# Patient Record
Sex: MALE | Race: BLACK OR AFRICAN AMERICAN | Employment: OTHER | URBAN - METROPOLITAN AREA
[De-identification: names, ages, dates, MRNs, and addresses within clinical notes are randomized per-mention and may not be internally consistent; named-entity substitution may affect disease eponyms.]

---

## 2020-02-02 ENCOUNTER — APPOINTMENT (OUTPATIENT)
Dept: GENERAL RADIOLOGY | Age: 75
End: 2020-02-02
Attending: EMERGENCY MEDICINE
Payer: MEDICARE

## 2020-02-02 ENCOUNTER — HOSPITAL ENCOUNTER (EMERGENCY)
Age: 75
Discharge: HOME | End: 2020-02-02
Attending: EMERGENCY MEDICINE
Payer: MEDICARE

## 2020-02-02 VITALS
TEMPERATURE: 97.9 F | WEIGHT: 200 LBS | OXYGEN SATURATION: 90 % | HEIGHT: 71 IN | BODY MASS INDEX: 28 KG/M2 | HEART RATE: 70 BPM | DIASTOLIC BLOOD PRESSURE: 89 MMHG | RESPIRATION RATE: 16 BRPM | SYSTOLIC BLOOD PRESSURE: 188 MMHG

## 2020-02-02 DIAGNOSIS — M70.21 OLECRANON BURSITIS OF RIGHT ELBOW: Primary | ICD-10-CM

## 2020-02-02 DIAGNOSIS — G89.29 CHRONIC LEFT FLANK PAIN: ICD-10-CM

## 2020-02-02 DIAGNOSIS — R10.9 CHRONIC LEFT FLANK PAIN: ICD-10-CM

## 2020-02-02 LAB
ALBUMIN SERPL-MCNC: 4.1 G/DL (ref 3.2–4.6)
ALBUMIN/GLOB SERPL: 1 {RATIO} (ref 1.2–3.5)
ALP SERPL-CCNC: 66 U/L (ref 50–136)
ALT SERPL-CCNC: 30 U/L (ref 12–65)
ANION GAP SERPL CALC-SCNC: 6 MMOL/L (ref 7–16)
APPEARANCE FLD: NORMAL
AST SERPL-CCNC: 24 U/L (ref 15–37)
BASOPHILS # BLD: 0 K/UL (ref 0–0.2)
BASOPHILS NFR BLD: 1 % (ref 0–2)
BILIRUB SERPL-MCNC: 0.3 MG/DL (ref 0.2–1.1)
BUN SERPL-MCNC: 11 MG/DL (ref 8–23)
CALCIUM SERPL-MCNC: 9.5 MG/DL (ref 8.3–10.4)
CHLORIDE SERPL-SCNC: 105 MMOL/L (ref 98–107)
CO2 SERPL-SCNC: 29 MMOL/L (ref 21–32)
COLOR FLD: NORMAL
CREAT SERPL-MCNC: 1.14 MG/DL (ref 0.8–1.5)
DIFFERENTIAL METHOD BLD: ABNORMAL
EOSINOPHIL # BLD: 0 K/UL (ref 0–0.8)
EOSINOPHIL NFR BLD: 0 % (ref 0.5–7.8)
ERYTHROCYTE [DISTWIDTH] IN BLOOD BY AUTOMATED COUNT: 14.9 % (ref 11.9–14.6)
FLUID COMMENTS, FCOM: NORMAL
GLOBULIN SER CALC-MCNC: 4.1 G/DL (ref 2.3–3.5)
GLUCOSE SERPL-MCNC: 93 MG/DL (ref 65–100)
HCT VFR BLD AUTO: 49.5 % (ref 41.1–50.3)
HGB BLD-MCNC: 15.7 G/DL (ref 13.6–17.2)
IMM GRANULOCYTES # BLD AUTO: 0.2 K/UL (ref 0–0.5)
IMM GRANULOCYTES NFR BLD AUTO: 3 % (ref 0–5)
LYMPHOCYTES # BLD: 1.6 K/UL (ref 0.5–4.6)
LYMPHOCYTES NFR BLD: 24 % (ref 13–44)
LYMPHOCYTES NFR BRONCH MANUAL: 88 %
MCH RBC QN AUTO: 26 PG (ref 26.1–32.9)
MCHC RBC AUTO-ENTMCNC: 31.7 G/DL (ref 31.4–35)
MCV RBC AUTO: 82.1 FL (ref 79.6–97.8)
MONOCYTES # BLD: 0.8 K/UL (ref 0.1–1.3)
MONOCYTES NFR BLD: 12 % (ref 4–12)
NEUTROPHILS NFR BRONCH MANUAL: 12 %
NEUTS SEG # BLD: 4 K/UL (ref 1.7–8.2)
NEUTS SEG NFR BLD: 60 % (ref 43–78)
NRBC # BLD: 0 K/UL (ref 0–0.2)
NUC CELL # FLD: 510 /CU MM
PLATELET # BLD AUTO: 433 K/UL (ref 150–450)
PMV BLD AUTO: 9.7 FL (ref 9.4–12.3)
POTASSIUM SERPL-SCNC: 3.7 MMOL/L (ref 3.5–5.1)
PROT SERPL-MCNC: 8.2 G/DL (ref 6.3–8.2)
RBC # BLD AUTO: 6.03 M/UL (ref 4.23–5.6)
RBC # FLD: NORMAL /CU MM
SODIUM SERPL-SCNC: 140 MMOL/L (ref 136–145)
SPECIMEN SOURCE FLD: NORMAL
WBC # BLD AUTO: 6.7 K/UL (ref 4.3–11.1)

## 2020-02-02 PROCEDURE — 80053 COMPREHEN METABOLIC PANEL: CPT

## 2020-02-02 PROCEDURE — 87205 SMEAR GRAM STAIN: CPT

## 2020-02-02 PROCEDURE — 85025 COMPLETE CBC W/AUTO DIFF WBC: CPT

## 2020-02-02 PROCEDURE — 99283 EMERGENCY DEPT VISIT LOW MDM: CPT

## 2020-02-02 PROCEDURE — 36415 COLL VENOUS BLD VENIPUNCTURE: CPT

## 2020-02-02 PROCEDURE — 89060 EXAM SYNOVIAL FLUID CRYSTALS: CPT

## 2020-02-02 PROCEDURE — 73080 X-RAY EXAM OF ELBOW: CPT

## 2020-02-02 PROCEDURE — 89050 BODY FLUID CELL COUNT: CPT

## 2020-02-02 PROCEDURE — 75810000054 HC ARTHOCENTISIS JOINT

## 2020-02-02 RX ORDER — NAPROXEN SODIUM 550 MG/1
550 TABLET ORAL 2 TIMES DAILY WITH MEALS
Qty: 10 TAB | Refills: 0 | Status: SHIPPED | OUTPATIENT
Start: 2020-02-02

## 2020-02-02 NOTE — DISCHARGE INSTRUCTIONS
Patient Education      Percent twice daily for pain and inflammation. Over-the-counter Tylenol 500 to 650 mg every 4 hours as needed for pain. Keep your right elbow wrapped and compressed as much as possible the next week. Ice to the area for 20 minutes every 4 hours while awake for 2 to 3 days. Call [facility] tomorrow for follow-up appointment and recheck in the next 1 to 2 weeks. Call the [facility] tomorrow for follow-up and recheck later this week. Your culture and crystal examination of the fluid removed from your elbow will need to be reviewed later this week when they return. Return here if redness warmth or fever in your right elbow. Return if fever. Bursitis: Care Instructions  Your Care Instructions    A bursa is a small sac of fluid that helps the tissues around a joint slide over one another easily. Injury or overuse of a joint can cause pain, redness, and inflammation in the bursa (bursitis). Bursitis usually gets better if you avoid the activity that caused it. You can help prevent bursitis from coming back by doing stretching and strengthening exercises. You may also need to change the way you do some activities. Follow-up care is a key part of your treatment and safety. Be sure to make and go to all appointments, and call your doctor if you are having problems. It's also a good idea to know your test results and keep a list of the medicines you take. How can you care for yourself at home? · Put ice or a cold pack on the area for 10 to 20 minutes at a time. Try to do this every 1 to 2 hours for the next 3 days (when you are awake) or until the swelling goes down. Put a thin cloth between the ice and your skin. · After the 3 days of using ice, you may use heat on the area. You can use a hot water bottle; a warm, moist towel; or a heating pad set on low. You can also try alternating heat and ice. · Rest the area where you have pain. Stop any activities that cause pain.  Switch to activities that do not stress the area. · Take pain medicines exactly as directed. ? If the doctor gave you a prescription medicine for pain, take it as prescribed. ? If you are not taking a prescription pain medicine, ask your doctor if you can take an over-the-counter medicine. ? Do not take two or more pain medicines at the same time unless the doctor told you to. Many pain medicines have acetaminophen, which is Tylenol. Too much acetaminophen (Tylenol) can be harmful. · To prevent stiffness, gently move the joint as much as you can without pain every day. As the pain gets better, keep doing range-of-motion exercises. Ask your doctor for exercises that will make the muscles around the joint stronger. Do these as directed. · You can slowly return to the activity that caused the pain, but do it with less effort until you can do it without pain or swelling. Be sure to warm up before and stretch after you do the activity. When should you call for help? Call your doctor now or seek immediate medical care if:    · You have new or worse symptoms of infection, such as:  ? Increased pain, swelling, warmth, or redness. ? Red streaks leading from the area. ? Pus draining from the area. ? A fever.    Watch closely for changes in your health, and be sure to contact your doctor if:    · You do not get better as expected. Where can you learn more? Go to http://keron-maverick.info/. Enter W526 in the search box to learn more about \"Bursitis: Care Instructions. \"  Current as of: June 26, 2019  Content Version: 12.2  © 1016-7563 [employer]. Care instructions adapted under license by [employer] (which disclaims liability or warranty for this information). If you have questions about a medical condition or this instruction, always ask your healthcare professional. [address] any warranty or liability for your use of this information.

## 2020-02-02 NOTE — ED TRIAGE NOTES
Pt ambulatory to triage without complications. Pt states his right elbow has fluid on it and went to the [place] and drained it on 12/31/19, but it came back and the [place] told him to go to the ER. Pt states his left flank also hurting since then and has to urinate frequently and foul odor in the urine. Pt denies fever, n/v/d, hematuria, or burning upon urination. Pt denies hx of DM.

## 2020-02-02 NOTE — ED PROVIDER NOTES
Patient presents with a swollen right elbow. He states the [place] clinic pull some fluid off it in the recent past but it is returned. He states they have told him to come to the emergency room instead of coming there. He has had some chronic left-sided flank pain for a few months it is worse with movement and denies any urinary symptoms. He denies any radiation of pain down to the leg or any numbness tingling is. He denies any abdominal pain. No fevers or chills. No warmth or redness around the right elbow. The history is provided by the patient. Elbow Pain    This is a recurrent problem. The current episode started more than 1 week ago. The problem occurs constantly. The problem has not changed since onset. The pain is present in the right elbow. The pain is mild. Pertinent negatives include no numbness, no tingling and no back pain. He has tried nothing for the symptoms. There has been no history of extremity trauma.         Past Medical History:   Diagnosis Date    Acute pancreatitis 10/20/2012    Blood pressure elevated without history of HTN 10/20/2012    Cervical radiculopathy     primarily right C5 & C6 nerve root    Cervical spinal stenosis     severe    Hypertension     Rheumatoid arthritis (HCC)     Right arm pain     Right arm weakness     Stenosis of cervical spine with myelopathy 3/15/2016       Past Surgical History:   Procedure Laterality Date    VASCULAR SURGERY PROCEDURE UNLIST  1990's    Vein stripped in left leg 20 years ago         Family History:   Problem Relation Age of Onset    Lung Disease Mother     Cancer Mother        Social History     Socioeconomic History    Marital status:      Spouse name: Not on file    Number of children: Not on file    Years of education: Not on file    Highest education level: Not on file   Occupational History    Not on file   Social Needs    Financial resource strain: Not on file    Food insecurity:     Worry: Not on file Inability: Not on file    Transportation needs:     Medical: Not on file     Non-medical: Not on file   Tobacco Use    Smoking status: Current Every Day Smoker     Packs/day: 1.00     Years: 40.00     Pack years: 40.00     Types: Cigarettes    Smokeless tobacco: Never Used   Substance and Sexual Activity    Alcohol use: No     Alcohol/week: 0.0 standard drinks    Drug use: No    Sexual activity: Not on file   Lifestyle    Physical activity:     Days per week: Not on file     Minutes per session: Not on file    Stress: Not on file   Relationships    Social connections:     Talks on phone: Not on file     Gets together: Not on file     Attends Anabaptist service: Not on file     Active member of club or organization: Not on file     Attends meetings of clubs or organizations: Not on file     Relationship status: Not on file    Intimate partner violence:     Fear of current or ex partner: Not on file     Emotionally abused: Not on file     Physically abused: Not on file     Forced sexual activity: Not on file   Other Topics Concern    Not on file   Social History Narrative    Not on file         ALLERGIES: Patient has no known allergies. Review of Systems   Constitutional: Negative for chills and fever. Respiratory: Negative for cough and shortness of breath. Cardiovascular: Negative for chest pain. Gastrointestinal: Negative for abdominal pain, blood in stool, diarrhea, nausea and vomiting. Genitourinary: Negative for dysuria, frequency and hematuria. Musculoskeletal: Negative for back pain. Neurological: Negative for tingling, weakness and numbness. Vitals:    02/02/20 1009   BP: 192/88   Pulse: 97   Resp: 16   Temp: 97.9 °F (36.6 °C)   SpO2: 95%   Weight: 90.7 kg (200 lb)   Height: 5' 11\" (1.803 m)            Physical Exam  Vitals signs and nursing note reviewed. Constitutional:       General: He is not in acute distress. Appearance: Normal appearance. He is not ill-appearing. HENT:      Mouth/Throat:      Mouth: Mucous membranes are moist.   Cardiovascular:      Rate and Rhythm: Regular rhythm. Heart sounds: Normal heart sounds. Pulmonary:      Effort: Pulmonary effort is normal.      Breath sounds: Normal breath sounds. Abdominal:      General: There is no distension. Palpations: Abdomen is soft. Tenderness: There is no abdominal tenderness. Comments: Tenderness in left lateral flank area and pain here with movement   Musculoskeletal:      Right elbow: He exhibits swelling and effusion. He exhibits normal range of motion and no laceration. No tenderness found. No radial head, no medial epicondyle, no lateral epicondyle and no olecranon process tenderness noted. Arms:       Comments: No midline thoracic or lumbar tenderness and no paraspinous thoracic or lumbar tenderness   Skin:     General: Skin is warm and dry. Neurological:      Mental Status: He is alert. MDM  Number of Diagnoses or Management Options  Diagnosis management comments: Very large right recurrent olecranon bursitis. I will x-ray to evaluate for underlying bone spore or bony etiology. Labs pending. Patient is given verbal consent for bursal aspiration which he requested. I discussed risks of introducing infection and damage to surrounding structures such as blood vessels nerves and bone.        Amount and/or Complexity of Data Reviewed  Clinical lab tests: ordered and reviewed (Results for orders placed or performed during the hospital encounter of 02/02/20  -CBC WITH AUTOMATED DIFF       Result                      Value             Ref Range           WBC                         6.7               4.3 - 11.1 K*       RBC                         6.03 (H)          4.23 - 5.6 M*       HGB                         15.7              13.6 - 17.2 *       HCT                         49.5              41.1 - 50.3 %       MCV                         82.1              79.6 - 97.8 * MCH                         26.0 (L)          26.1 - 32.9 *       MCHC                        31.7              31.4 - 35.0 *       RDW                         14.9 (H)          11.9 - 14.6 %       PLATELET                    433               150 - 450 K/*       MPV                         9.7               9.4 - 12.3 FL       ABSOLUTE NRBC               0.00              0.0 - 0.2 K/*       DF                          AUTOMATED                             NEUTROPHILS                 60                43 - 78 %           LYMPHOCYTES                 24                13 - 44 %           MONOCYTES                   12                4.0 - 12.0 %        EOSINOPHILS                 0 (L)             0.5 - 7.8 %         BASOPHILS                   1                 0.0 - 2.0 %         IMMATURE GRANULOCYTES       3                 0.0 - 5.0 %         ABS. NEUTROPHILS            4.0               1.7 - 8.2 K/*       ABS. LYMPHOCYTES            1.6               0.5 - 4.6 K/*       ABS. MONOCYTES              0.8               0.1 - 1.3 K/*       ABS. EOSINOPHILS            0.0               0.0 - 0.8 K/*       ABS. BASOPHILS              0.0               0.0 - 0.2 K/*       ABS. IMM.  GRANS.            0.2               0.0 - 0.5 K/*  -METABOLIC PANEL, COMPREHENSIVE       Result                      Value             Ref Range           Sodium                      140               136 - 145 mm*       Potassium                   3.7               3.5 - 5.1 mm*       Chloride                    105               98 - 107 mmo*       CO2                         29                21 - 32 mmol*       Anion gap                   6 (L)             7 - 16 mmol/L       Glucose                     93                65 - 100 mg/*       BUN                         11                8 - 23 MG/DL        Creatinine                  1.14              0.8 - 1.5 MG*       GFR est AA                  >60               >60 ml/min/1* GFR est non-AA              >60               >60 ml/min/1*       Calcium                     9.5               8.3 - 10.4 M*       Bilirubin, total            0.3               0.2 - 1.1 MG*       ALT (SGPT)                  30                12 - 65 U/L         AST (SGOT)                  24                15 - 37 U/L         Alk.  phosphatase            66                50 - 136 U/L        Protein, total              8.2               6.3 - 8.2 g/*       Albumin                     4.1               3.2 - 4.6 g/*       Globulin                    4.1 (H)           2.3 - 3.5 g/*       A-G Ratio                   1.0 (L)           1.2 - 3.5      Urine dip negative    Results for orders placed or performed during the hospital encounter of 02/02/20  -CBC WITH AUTOMATED DIFF       Result                      Value             Ref Range           WBC                         6.7               4.3 - 11.1 K*       RBC                         6.03 (H)          4.23 - 5.6 M*       HGB                         15.7              13.6 - 17.2 *       HCT                         49.5              41.1 - 50.3 %       MCV                         82.1              79.6 - 97.8 *       MCH                         26.0 (L)          26.1 - 32.9 *       MCHC                        31.7              31.4 - 35.0 *       RDW                         14.9 (H)          11.9 - 14.6 %       PLATELET                    433               150 - 450 K/*       MPV                         9.7               9.4 - 12.3 FL       ABSOLUTE NRBC               0.00              0.0 - 0.2 K/*       DF                          AUTOMATED                             NEUTROPHILS                 60                43 - 78 %           LYMPHOCYTES                 24                13 - 44 %           MONOCYTES                   12                4.0 - 12.0 %        EOSINOPHILS                 0 (L)             0.5 - 7.8 %         BASOPHILS                   1 0.0 - 2.0 %         IMMATURE GRANULOCYTES       3                 0.0 - 5.0 %         ABS. NEUTROPHILS            4.0               1.7 - 8.2 K/*       ABS. LYMPHOCYTES            1.6               0.5 - 4.6 K/*       ABS. MONOCYTES              0.8               0.1 - 1.3 K/*       ABS. EOSINOPHILS            0.0               0.0 - 0.8 K/*       ABS. BASOPHILS              0.0               0.0 - 0.2 K/*       ABS. IMM. GRANS.            0.2               0.0 - 0.5 K/*  -METABOLIC PANEL, COMPREHENSIVE       Result                      Value             Ref Range           Sodium                      140               136 - 145 mm*       Potassium                   3.7               3.5 - 5.1 mm*       Chloride                    105               98 - 107 mmo*       CO2                         29                21 - 32 mmol*       Anion gap                   6 (L)             7 - 16 mmol/L       Glucose                     93                65 - 100 mg/*       BUN                         11                8 - 23 MG/DL        Creatinine                  1.14              0.8 - 1.5 MG*       GFR est AA                  >60               >60 ml/min/1*       GFR est non-AA              >60               >60 ml/min/1*       Calcium                     9.5               8.3 - 10.4 M*       Bilirubin, total            0.3               0.2 - 1.1 MG*       ALT (SGPT)                  30                12 - 65 U/L         AST (SGOT)                  24                15 - 37 U/L         Alk.  phosphatase            66                50 - 136 U/L        Protein, total              8.2               6.3 - 8.2 g/*       Albumin                     4.1               3.2 - 4.6 g/*       Globulin                    4.1 (H)           2.3 - 3.5 g/*       A-G Ratio                   1.0 (L)           1.2 - 3.5      -CELL COUNT, BODY FLUID       Result                      Value             Ref Range           BODY FLUID TYPE BURSA                                 FLUID COLOR                 RED                                   FLUID APPEARANCE            CLOUDY                                FLUID RBC CT.               84,000            /cu mm              FLUID WBC COUNT             510               /cu mm         )  Tests in the radiology section of CPT®: ordered and reviewed (Xr Elbow Rt Min 3 V    Result Date: 2/2/2020  EXAM: 3 views of the right elbow. INDICATION: Elbow pain and swelling. COMPARISON: None. FINDINGS: Prominent soft tissue swelling of the dorsum of the elbow. There is a osteophytic spur at the triceps insertion on the olecranon with a transversely oriented fracture of the spur which appears subacute to chronic. No clear elbow joint effusion. IMPRESSION: 1. Prominent dorsal elbow swelling likely with a large volume of fluid in the olecranon bursa. Gout should be considered. 2. Large enthesophyte at the triceps insertion on the olecranon with a subacute to chronic appearing fracture across this enthesophyte.     )           Arthrocentesis  Date/Time: 2/2/2020 11:34 AM  Performed by: Jose Angel MD  Authorized by: Jose Angel MD     Consent:     Consent obtained:  Verbal    Consent given by:  Patient    Risks discussed:  Bleeding, infection, pain, incomplete drainage and nerve damage    Alternatives discussed:  No treatment  Location:     Location:  Elbow    Elbow:  R elbow  Anesthesia (see MAR for exact dosages): Anesthesia method:  None  Procedure details:     Preparation: Patient was prepped and draped in usual sterile fashion      Needle gauge:  18 G    Ultrasound guidance: no      Approach:  Posterior    Aspirate characteristics:  Blood-tinged    Steroid injected: no      Specimen collected: yes    Post-procedure details:     Dressing:  Adhesive bandage and gauze roll    Patient tolerance of procedure:   Tolerated well, no immediate complications  Comments:      18-gauge needle was introduced into the posterior elbow and the olecranon bursal sac with return of about 80 cc of blood-tinged bursal fluid. Patient tolerated procedure well. Fluid was sent to the lab for Gram stain and culture and cell count.

## 2020-02-02 NOTE — ED NOTES
I have reviewed discharge instructions with the patient. The patient verbalized understanding. Patient left ED via Discharge Method: ambulatory to Home with (insert name of family/friend, self, transport). Opportunity for questions and clarification provided. Patient given 1 scripts. To continue your aftercare when you leave the hospital, you may receive an automated call from our care team to check in on how you are doing. This is a free service and part of our promise to provide the best care and service to meet your aftercare needs.  If you have questions, or wish to unsubscribe from this service please call [phone]. Thank you for Choosing our [address] [place] [address] Emergency Department.

## 2020-02-03 LAB
BODY FLD TYPE: NORMAL
CRYSTALS FLD MICRO: NORMAL

## 2020-02-04 LAB
BACTERIA SPEC CULT: NORMAL
GRAM STN SPEC: NORMAL
GRAM STN SPEC: NORMAL
SERVICE CMNT-IMP: NORMAL